# Patient Record
Sex: FEMALE | Race: ASIAN | URBAN - METROPOLITAN AREA
[De-identification: names, ages, dates, MRNs, and addresses within clinical notes are randomized per-mention and may not be internally consistent; named-entity substitution may affect disease eponyms.]

---

## 2022-07-08 ENCOUNTER — OFFICE VISIT (OUTPATIENT)
Dept: URBAN - METROPOLITAN AREA CLINIC 26 | Facility: CLINIC | Age: 52
End: 2022-07-08
Payer: COMMERCIAL

## 2022-07-08 DIAGNOSIS — H57.10 OCULAR PAIN, UNSPECIFIED EYE: Primary | ICD-10-CM

## 2022-07-08 DIAGNOSIS — H04.123 DRY EYE SYNDROME OF BILATERAL LACRIMAL GLANDS: ICD-10-CM

## 2022-07-08 DIAGNOSIS — H52.13 MYOPIA, BILATERAL: ICD-10-CM

## 2022-07-08 PROCEDURE — 92004 COMPRE OPH EXAM NEW PT 1/>: CPT | Performed by: OPTOMETRIST

## 2022-07-08 ASSESSMENT — INTRAOCULAR PRESSURE
OS: 9
OD: 10

## 2022-07-08 ASSESSMENT — KERATOMETRY
OS: 41.50
OD: 41.13

## 2022-07-08 ASSESSMENT — VISUAL ACUITY
OD: 20/30
OS: 20/25

## 2022-07-08 NOTE — IMPRESSION/PLAN
Impression: Ocular pain, unspecified eye: H57.10. Plan: Ocular health appears normal. Discussed possible sinus pressure causing discomfort. If pain continues recommend MRI or other scan. Pt will discuss with PCP. no tx needed at this time.